# Patient Record
Sex: MALE | Race: WHITE | NOT HISPANIC OR LATINO
[De-identification: names, ages, dates, MRNs, and addresses within clinical notes are randomized per-mention and may not be internally consistent; named-entity substitution may affect disease eponyms.]

---

## 2021-02-17 PROBLEM — Z00.00 ENCOUNTER FOR PREVENTIVE HEALTH EXAMINATION: Status: ACTIVE | Noted: 2021-02-17

## 2021-03-02 ENCOUNTER — APPOINTMENT (OUTPATIENT)
Dept: GASTROENTEROLOGY | Facility: CLINIC | Age: 26
End: 2021-03-02
Payer: COMMERCIAL

## 2021-03-02 VITALS
OXYGEN SATURATION: 98 % | SYSTOLIC BLOOD PRESSURE: 124 MMHG | TEMPERATURE: 98.2 F | HEIGHT: 68 IN | HEART RATE: 97 BPM | DIASTOLIC BLOOD PRESSURE: 70 MMHG | WEIGHT: 265 LBS | BODY MASS INDEX: 40.16 KG/M2

## 2021-03-02 DIAGNOSIS — K62.5 HEMORRHAGE OF ANUS AND RECTUM: ICD-10-CM

## 2021-03-02 PROCEDURE — 99072 ADDL SUPL MATRL&STAF TM PHE: CPT

## 2021-03-02 PROCEDURE — 99204 OFFICE O/P NEW MOD 45 MIN: CPT

## 2021-03-02 NOTE — HISTORY OF PRESENT ILLNESS
[FreeTextEntry1] : Mr. Knutson is a pleasant 25M h/o HTN, obesity who is referred by AYLEEN Ramirez for evaluation of rectal bleeding.\par \par States he has been seeing blood on the toilet paper and initially in the toilet water since 1/11/21 3 days after his COVID vaccine.\par \par Initially he was seeing bright red blood on top of brown formed stool and in the toilet water, in addition to on the toilet paper.  Has slowed, now sees it frequently on the toilet paper.\par \par Stool is brown, formed, no black stool, no blood clots.  \par \par Has had mild constipation, has not used anything for this.\par \par No weight loss, abd pain, heartburn, regurgitation, N/V.\par \par Does not drink or smoke.\par \par Does not know his FHx.

## 2021-03-02 NOTE — ASSESSMENT
[FreeTextEntry1] : Likely due to hemorrhoids, however given his chronicity of symptoms for almost three months and a rising incidence of colon cancer in the population less than 49 y/o, will plan on a colonoscopy for further evaluation.\par \par Will plan on a colonoscopy for rectal bleeding.  Explained risks/benefits/alternatives including not limited to bleeding, infection, perforation, missed lesions, anesthesia complications.  Patient understands and agrees, all questions answered.  Will use a split dose miralax/gatorade prep with clears the day prior.\par \par Thank you for referring Mr. GARAY.  Please do not hesitate to call to further discuss his/her care.\par \par Note faxed to requesting MD.\par \par

## 2021-03-21 ENCOUNTER — RESULT REVIEW (OUTPATIENT)
Age: 26
End: 2021-03-21

## 2021-03-24 ENCOUNTER — APPOINTMENT (OUTPATIENT)
Dept: GASTROENTEROLOGY | Facility: HOSPITAL | Age: 26
End: 2021-03-24